# Patient Record
Sex: MALE | Race: BLACK OR AFRICAN AMERICAN
[De-identification: names, ages, dates, MRNs, and addresses within clinical notes are randomized per-mention and may not be internally consistent; named-entity substitution may affect disease eponyms.]

---

## 2021-02-19 ENCOUNTER — HOSPITAL ENCOUNTER (INPATIENT)
Dept: HOSPITAL 53 - M ED | Age: 51
LOS: 5 days | Discharge: HOME | DRG: 885 | End: 2021-02-24
Attending: PSYCHIATRY & NEUROLOGY
Payer: MEDICARE

## 2021-02-19 VITALS — HEIGHT: 72 IN | WEIGHT: 230.49 LBS | BODY MASS INDEX: 31.22 KG/M2

## 2021-02-19 DIAGNOSIS — Z88.8: ICD-10-CM

## 2021-02-19 DIAGNOSIS — R45.851: ICD-10-CM

## 2021-02-19 DIAGNOSIS — F31.9: Primary | ICD-10-CM

## 2021-02-19 DIAGNOSIS — F60.2: ICD-10-CM

## 2021-02-19 DIAGNOSIS — Z79.82: ICD-10-CM

## 2021-02-19 DIAGNOSIS — E78.5: ICD-10-CM

## 2021-02-19 DIAGNOSIS — F41.9: ICD-10-CM

## 2021-02-19 DIAGNOSIS — J44.9: ICD-10-CM

## 2021-02-19 DIAGNOSIS — E11.9: ICD-10-CM

## 2021-02-19 DIAGNOSIS — Z79.899: ICD-10-CM

## 2021-02-19 DIAGNOSIS — I10: ICD-10-CM

## 2021-02-19 LAB
ALBUMIN SERPL BCG-MCNC: 3.7 GM/DL (ref 3.2–5.2)
ALT SERPL W P-5'-P-CCNC: 27 U/L (ref 12–78)
AMPHETAMINES UR QL SCN: POSITIVE
APAP SERPL-MCNC: < 2 UG/ML (ref 10–30)
BARBITURATES UR QL SCN: NEGATIVE
BASOPHILS # BLD AUTO: 0.1 10^3/UL (ref 0–0.2)
BASOPHILS NFR BLD AUTO: 0.7 % (ref 0–1)
BENZODIAZ UR QL SCN: NEGATIVE
BILIRUB CONJ SERPL-MCNC: 0.2 MG/DL (ref 0–0.2)
BILIRUB SERPL-MCNC: 0.8 MG/DL (ref 0.2–1)
BUN SERPL-MCNC: 17 MG/DL (ref 7–18)
BZE UR QL SCN: POSITIVE
CALCIUM SERPL-MCNC: 9 MG/DL (ref 8.5–10.1)
CANNABINOIDS UR QL SCN: NEGATIVE
CHLORIDE SERPL-SCNC: 105 MEQ/L (ref 98–107)
CK SERPL-CCNC: 639 U/L (ref 39–308)
CO2 SERPL-SCNC: 24 MEQ/L (ref 21–32)
CREAT SERPL-MCNC: 1.58 MG/DL (ref 0.7–1.3)
EOSINOPHIL # BLD AUTO: 0.1 10^3/UL (ref 0–0.5)
EOSINOPHIL NFR BLD AUTO: 1.5 % (ref 0–3)
ETHANOL SERPL-MCNC: 0.01 % (ref 0–0.01)
GFR SERPL CREATININE-BSD FRML MDRD: 49.7 ML/MIN/{1.73_M2} (ref 56–?)
GLUCOSE SERPL-MCNC: 115 MG/DL (ref 70–100)
HCT VFR BLD AUTO: 44.5 % (ref 42–52)
HGB BLD-MCNC: 14.8 G/DL (ref 13.5–17.5)
LYMPHOCYTES # BLD AUTO: 4.9 10^3/UL (ref 1.5–5)
LYMPHOCYTES NFR BLD AUTO: 55.8 % (ref 24–44)
MCH RBC QN AUTO: 28.2 PG (ref 27–33)
MCHC RBC AUTO-ENTMCNC: 33.3 G/DL (ref 32–36.5)
MCV RBC AUTO: 84.9 FL (ref 80–96)
METHADONE UR QL SCN: NEGATIVE
MONOCYTES # BLD AUTO: 0.6 10^3/UL (ref 0–0.8)
MONOCYTES NFR BLD AUTO: 7 % (ref 2–8)
NEUTROPHILS # BLD AUTO: 3.1 10^3/UL (ref 1.5–8.5)
NEUTROPHILS NFR BLD AUTO: 34.8 % (ref 36–66)
OPIATES UR QL SCN: NEGATIVE
PCP UR QL SCN: NEGATIVE
PLATELET # BLD AUTO: 261 10^3/UL (ref 150–450)
POTASSIUM SERPL-SCNC: 4.1 MEQ/L (ref 3.5–5.1)
PROT SERPL-MCNC: 7.1 GM/DL (ref 6.4–8.2)
RBC # BLD AUTO: 5.24 10^6/UL (ref 4.3–6.1)
SALICYLATES SERPL-MCNC: 2.7 MG/DL (ref 5–30)
SODIUM SERPL-SCNC: 138 MEQ/L (ref 136–145)
TROPONIN I SERPL-MCNC: 0.09 NG/ML (ref ?–0.1)
TSH SERPL DL<=0.005 MIU/L-ACNC: 0.34 UIU/ML (ref 0.36–3.74)
WBC # BLD AUTO: 8.8 10^3/UL (ref 4–10)

## 2021-02-19 NOTE — CCD
Summarization Of Episode

                             Created on: 2021



STEPHENIE KAISER

External Reference #: [id number]

: 1970

Sex: Male



Demographics





                          Address                   [address]  [address]

 

                          Home Phone                [phone]

 

                          Preferred Language        English

 

                          Marital Status            

 

                          Congregation Affiliation     NONE

 

                          Race                      Black or 

 

                          Ethnic Group              Not  or 





Author





                          Author                    [facility]              [facility]

 

                          Address                   Unknown

 

                          Phone                     Unavailable







Support





                Name            Relationship    Address         Phone

 

                UE              Next Of Kin     Unknown         Unavailable



                                  



Re-disclosure Warning

          The records that you are about to access may contain information from 
federally-assisted alcohol or drug abuse programs. If such information is 
present, then the following federally mandated warning applies: This information
has been disclosed to you from records protected by federal confidentiality 
rules (42 CFR part 2). The federal rules prohibit you from making any further 
disclosure of this information unless further disclosure is expressly permitted 
by the written consent of the person to whom it pertains or as otherwise 
permitted by 42 CFR part 2. A general authorization for the release of medical 
or other information is NOT sufficient for this purpose. The Federal rules 
restrict any use of the information to criminally investigate or prosecute any 
alcohol or drug abuse patient.The records that you are about to access may 
contain highly sensitive health information, the redisclosure of which is 
protected by Article 27-F of the [facility] Public Health law. If you 
continue you may have access to information: Regarding HIV / AIDS; Provided by 
facilities licensed or operated by the [facility] Office of Mental Health; 
or Provided by the [facility] Office for People With Developmental 
Disabilities. If such information is present, then the following [place] 
mandated warning applies: This information has been disclosed to you from 
confidential records which are protected by state law. State law prohibits you 
from making any further disclosure of this information without the specific 
written consent of the person to whom it pertains, or as otherwise permitted by 
law. Any unauthorized further disclosure in violation of state law may result in
a fine or senior care sentence or both. A general authorization for the release of 
medical or other information is NOT sufficient authorization for further disc
losure.                                                          



Insurance Providers

          



             Payer name   Policy type / Coverage type Policy ID    Covered party

 ID Covered 

party's relationship to lock Policy Lock             Plan Information

 

          [facility]           [id number]                             12

[id number]

## 2021-02-20 VITALS — DIASTOLIC BLOOD PRESSURE: 88 MMHG | SYSTOLIC BLOOD PRESSURE: 140 MMHG

## 2021-02-20 VITALS — DIASTOLIC BLOOD PRESSURE: 84 MMHG | SYSTOLIC BLOOD PRESSURE: 186 MMHG

## 2021-02-20 RX ADMIN — ACETAMINOPHEN PRN MG: 325 TABLET ORAL at 01:57

## 2021-02-20 RX ADMIN — INSULIN LISPRO SCH UNITS: 100 INJECTION, SOLUTION INTRAVENOUS; SUBCUTANEOUS at 21:00

## 2021-02-20 RX ADMIN — Medication SCH UNITS: at 14:48

## 2021-02-20 RX ADMIN — BUDESONIDE AND FORMOTEROL FUMARATE DIHYDRATE SCH PUFF: 160; 4.5 AEROSOL RESPIRATORY (INHALATION) at 21:00

## 2021-02-20 RX ADMIN — ACETAMINOPHEN PRN MG: 325 TABLET ORAL at 21:05

## 2021-02-20 RX ADMIN — ASPIRIN 81 MG CHEWABLE TABLET SCH MG: 81 TABLET CHEWABLE at 14:49

## 2021-02-20 RX ADMIN — INSULIN DETEMIR SCH UNITS: 100 INJECTION, SOLUTION SUBCUTANEOUS at 21:01

## 2021-02-20 RX ADMIN — INSULIN LISPRO SCH UNITS: 100 INJECTION, SOLUTION INTRAVENOUS; SUBCUTANEOUS at 17:08

## 2021-02-20 RX ADMIN — ATORVASTATIN CALCIUM SCH MG: 20 TABLET, FILM COATED ORAL at 14:48

## 2021-02-20 RX ADMIN — LOSARTAN POTASSIUM SCH MG: 25 TABLET, FILM COATED ORAL at 15:58

## 2021-02-20 NOTE — MHHPEPDOC
General


Date Of Admission:  Feb 20, 2021


Legal Status:  9.39


Chief Complaint


"Hearing voices, suicidal thoughts, alcohol and drugs "





History of Present Illness


HISTORY OF THE PRESENT ILLNESS: Patient is a 50 -year-old , 

male, who states he is hearing voices, having suicidal thoughts using alcohol 

and drugs. He states he has been having these symptoms for 4-5 days. He rents a 

room, which is part of an apartment and has been in [place] for approximately 

3 weeks. He has no psychiatric history here because he has never been in 

[place] and as mentioned, he has been in [place], 2-3 weeks prior to that he

was in [place] and states she had no psychiatric care there. He does recall that

he has had psychiatric care at some point, but he has no memory of his last 

visit. He has had several hospitalizations. He does not recall when the last one

was. He was born in [place], and states unfortunately, "unfortunately he has 

family there." He has a grandmother and cousins in [place]. 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 He states pr

esently "there is no person in the world requiring my presence and no place for 

me to be." He has been  and  twice and he has 2 children from his

first wife, but he doesn't know where they are. He has no present legal 

difficulties but has previously been arrested for disorderly conduct. His 

alcohol use has been in and he states that some first time he's been drinking in

30 years, but he has also used methamphetamines and crack. He states this is his

first time using methamphetamine, but he is been using crack off and on since 

1996. He states he is hearing voices modeled though they are, and numbering 3. 

He states they are talking and berating him. He states he has been depressed 

since Monday of this week. He states he is made over 15 suicide attempts using 

different methods of overdose and insulin. He is self educated but also has an 

associates degree. He states he has been treated for depression using Zoloft and

Depakote did not like the Depakote because he stopped the elevated moods. He 

states he has never had manic episodes or over elevated moods. He states he is 

not presently paranoid, but he is having auditory hallucinations and suicidal 

ideas. He is fully oriented. He has had difficulty sleeping. His appetite is 

good.





Psychiatric Review of Systems


Depression (2 or more weeks):  depressed mood, insomnia/hypersomnia, feelings of

worthlesness, suicidal thoughts


Tisha (4 or more days of):  denies


Psychosis:  auditory hallucination


PTSD:  denies


Anxiety:  denies





Past Psychiatric History


Previous Psychiatric Diagnosis: He does not have a documented diagnosis and does

not recall treatment except for use of Zoloft and Depakote.


Previous Psychiatric Admissions:. He has had psychiatric admissions but does not

recall the last one.


Suicide Attempts:. He states he is made over 15 suicide attempts.


Psychiatric Follow-up:. He does not recall or discuss any psychiatric follow-up.


Psychiatric medications:. In the past. He recalls Zoloft and Depakote.





Past Medical History


Medical Problems


He describes no medical problems


Head Injury:  No


Seizures:  No


Hospitalizations:  Yes


Surgeries:  No





Family Medical/Psychiatric HX


Psychiatric Disorders:  Yes


Addiction:  Yes


Suicide Attemps/Completions:  Yes





Addiction History


alcohol, cocaine, methamphetamines





Social History


Childhood: 


Abuse/Trauma:.


Current Living Situation: Presently living in an apartment in [place].


Education: Associates degree.


Employment:. Numerous businesses.


Social Support: None noted.


Legal:. History of disorderly conduct.


Marital:. 2 marriages with 2 children from first marriage.





Mental Status Examination


General Appearance:  well groomed


Build:  average


Demeanor:  average


Eye Contact:  avoidant


Activity:  average


Behavior:  cooperative


Speech:  clear


Mood:  euthymic


Mood


Sad


Affect:  flat


Thought Process:  logical/linear


Thought Content (Delusions):  none reported


Thought Content (Aggressive):  none reported


Perception (Hallucinations):  auditory


Perception (Other):  none reported


Cognition (Impairment of):  none reported


Cognition(Intelligence Est.):  above average


Oriented:  Oriented times three


Insight:  good


Judgment:  Poor


Psychosis:  Denies





Diagnoses


Major depressive illness, chronic substance abuse





A-FIB/CHADSVASC


A-FIB History


Current/History of A-Fib/PAF?:  No


Current PO Anticoag Therapy:  No





Age/Risk Factor Scoring


CHADSVASC:  








CHADSVASC Response (Comments) Value


 


Age Risk Factor Age < 65 years old 0


 


Total  0











Treatment


Treatment ordered:  NONE





Initial Treatment Plan


1. Patient was admitted on a [9.39] status.


2. Complete history was obtained.


3. With patients permission, family will be contacted and database will be 

expanded. 


4. Patients medication regimen will be reviewed and changed accordingly. 


5. Patient will be provided with protected environment. 


6. Patient will be treated with individual, group, and milieu therapies. 


7. Patient will receive supportive psych-education.


8. Discharge planning will commence immediately.


9. Outpatient follow-up treatment will be strongly recommended.


10. The initial treatment plan will focus initially on:


* Depression.


* Risk for suicide.





ESTIMATED LENGTH OF STAY: - DAYS.





TIME SPENT COUNSELING AND COORDINATING INITIAL CARE:  minutes.





Vital Signs





Vital Signs








  Date Time  Temp Pulse Resp B/P (MAP) Pulse Ox O2 Delivery O2 Flow Rate FiO2


 


2/20/21 06:56 97.2 88 18 186/84 (118) 96 Room Air  











Laboratory Data


24H Labs


Laboratory Tests 2


2/19/21 16:47: 


Immature Granulocyte % (Auto) 0.2, Neutrophils (%) (Auto) 34.8L, Lymphocytes (%)

 (Auto) 55.8H, Monocytes (%) (Auto) 7.0, Eosinophils (%) (Auto) 1.5, Basophils 

(%) (Auto) 0.7, Neutrophils # (Auto) 3.1, Lymphocytes # (Auto) 4.9, Monocytes # 

(Auto) 0.6, Eosinophils # (Auto) 0.1, Basophils # (Auto) 0.1, Nucleated Red 

Blood Cells % (auto) 0.0, Anion Gap 9, Glomerular Filtration Rate 49.7L, Calcium

 Level 9.0, Total Bilirubin 0.8, Direct Bilirubin 0.2, Aspartate Amino Transf 

(AST/SGOT) 20, Alanine Aminotransferase (ALT/SGPT) 27, Alkaline Phosphatase 80, 

Total Creatine Kinase 639H, Troponin I 0.09, Total Protein 7.1, Albumin 3.7, 

Albumin/Globulin Ratio 1.1, Thyroid Stimulating Hormone (TSH) 0.341L, Sali

cylates Level 2.7L, Urine Opiates Screen NEGATIVE, Urine Methadone Screen 

NEGATIVE, Acetaminophen Level < 2.0L, Urine Barbiturates Screen NEGATIVE, Urine 

Phencyclidine Screen NEGATIVE, Urine Amphetamines Screen POSITIVEH, Urine 

Benzodiazepines Screen NEGATIVE, Urine Cocaine Metabolite Screen POSITIVEH, 

Urine Cannabinoids Screen NEGATIVE, Ethyl Alcohol Level 0.007


CBC/BMP


Laboratory Tests


2/19/21 16:47











Medications


Scheduled


Amlodipine Besylate (Amlodipine Besylate) 10 Mg Tablet, 10 MG PO DAILY, 

(Reported)


Aspirin (Aspirin) 81 Mg Tab.chew, 81 MG PO DAILY for pain, (Reported)


Atorvastatin Calcium (Atorvastatin Calcium) 80 Mg Tablet, 80 MG PO DAILY, 

(Reported)


Insulin Glargine,Hum.rec.anlog (Lantus Solostar) 100 Unit/1 Ml Insuln.pen, 10 

UNIT SC QPM, (Reported)


Ipratropium/Albuterol Sulfate (Combivent Respimat  Mcg) 4 Gm Mist.inhal, 2

 PUFF INH TID, (Reported)





Allergies


Coded Allergies:  


     lisinopril (Verified  Allergy, Unknown, Tongue swelling, 2/19/21)


     prochlorperazine (Verified  Allergy, Unknown, tongue swelling, 2/19/21)











ANGELINE GONZALEZ MD            Feb 20, 2021 09:58

## 2021-02-20 NOTE — HPEPDOC
General


Date of Admission


2021 at 19:34


Date of Service:  2021


Chief Complaint


The patient is a 50-year-old male admitted with a reason for visit of 

Schizophrenia.


Source:  Patient


Exam Limitations:  No limitations





History of Present Illness


Pt is 51 yo M with PMH of type 2  diabetes, Anxiety, Depression, chronic kidney 

diseases, hypertension, hyperlipidemia presented to the hospital with hearing 

voices, having suicidal thoughts using alcohol and drugs. He states he has been 

having these symptoms for 4-5 days. Patient denies fever, chills, nausea, chest 

pain, shortness of breath, diarrhea or dysuria





Home Medications


Scheduled


Amlodipine Besylate (Amlodipine Besylate) 10 Mg Tablet, 5 MG PO DAILY for ., 

(Reported)


Aspirin (Aspirin) 81 Mg Tab.chew, 81 MG PO DAILY for ., (Reported)


Atorvastatin Calcium (Atorvastatin Calcium) 80 Mg Tablet, 80 MG PO DAILY for ., 

(Reported)


Budesonide/Formoterol (Symbicort 160-4.5 Mcg Inhaler) 6 Gm Hfa.aer.ad, 2 PUFF 

INH BID for ., (Reported)


Cholecalciferol (Vitamin D3) (Vitamin D3) 1,000 Unit Tablet, 1,000 UNITS PO 

DAILY for ., (Reported)


Insulin Glargine,Hum.rec.anlog (Lantus Solostar) 100 Unit/1 Ml Insuln.pen, 20 

UNIT SC QPM for ., (Reported)


Omega-3/Dha/Epa/Fish Oil (Omega-3 Fish Oil 1,000 mg Sfgl) 1,000 Mg Capsule, 

2,000 MG PO BID for ., (Reported)





Scheduled PRN


Ipratropium/Albuterol Sulfate (Combivent Respimat  Mcg) 4 Gm Mist.inhal, 1

PUFF INH QID PRN for SHORTNESS OF BREATH, (Reported)





Miscellaneous Medications


[Comments]  , for ., (Reported)


   MED LIST PROVIDED BY [facility] 





Allergies


Coded Allergies:  


     lisinopril (Verified  Allergy, Unknown, Tongue swelling, 21)


     prochlorperazine (Verified  Allergy, Unknown, tongue swelling, 21)





Past Medical History


Medical History


type 2  diabetes, Anxiety, Depression, chronic kidney diseases, hypertension, 

hyperlipidemia, COPD, Nephrotic syndrome





Family History


Mother  from colon cancer and uterine cancer





Social History


* Smoker:  current smoker


Alcohol:  heavy


Drugs:  IV drug use





A-FIB/CHADSVASC


A-FIB History


Current/History of A-Fib/PAF?:  No


Current PO Anticoag Therapy:  No





Review of Systems


Constitutional:  Denies: Chills


Eyes:  Denies: Pain


ENT:  Denies: Head Aches


Skin:  Denies: Rash


Pulmonary:  Denies: Dyspnea


Cardiovascular:  Denies: Chest Pain, Palpitations


Gastrointestinal:  Denies: Nausea


Genitourinary:  Denies: Dysuria


Hematologic:  Denies: Bruising


Endocrine:  Denies: Polydipsia, Polyphagia


Musculoskeletal:  Denies: Neck Pain


Neurological:  Denies: Weakness


Psych:  Reports: Anxiety, Depression





Physical Examination


General Exam:  Positive: Alert, Cooperative


Eye Exam:  Positive: PERRLA


ENT Exam:  Positive: Atraumatic


Neck Exam:  Positive: Supple; 


   Negative: JVD


Chest Exam:  Positive: Clear to auscultation


Heart Exam:  Positive: Rate Normal


Telemetry:  Positive: No significant arrhythmia


Abdomen Exam:  Positive: Normal bowel sounds


Extremity Exam:  Negative: Clubbing, Cyanosis


Skin Exam:  Positive: Nl turgor and temperature


Neuro Exam:  Positive: Strength at 5/5 X4 ext


Psych Exam:  Positive: Anxiety





Vital Signs





Vital Signs








  Date Time  Temp Pulse Resp B/P (MAP) Pulse Ox O2 Delivery O2 Flow Rate FiO2


 


21 06:56 97.2 88 18 186/84 (118) 96 Room Air  











Laboratory Data


Labs 24H


Laboratory Tests 2


21 16:47: 


Immature Granulocyte % (Auto) 0.2, Neutrophils (%) (Auto) 34.8L, Lymphocytes (%)

 (Auto) 55.8H, Monocytes (%) (Auto) 7.0, Eosinophils (%) (Auto) 1.5, Basophils 

(%) (Auto) 0.7, Neutrophils # (Auto) 3.1, Lymphocytes # (Auto) 4.9, Monocytes # 

(Auto) 0.6, Eosinophils # (Auto) 0.1, Basophils # (Auto) 0.1, Nucleated Red 

Blood Cells % (auto) 0.0, Anion Gap 9, Glomerular Filtration Rate 49.7L, Calcium

 Level 9.0, Total Bilirubin 0.8, Direct Bilirubin 0.2, Aspartate Amino Transf 

(AST/SGOT) 20, Alanine Aminotransferase (ALT/SGPT) 27, Alkaline Phosphatase 80, 

Total Creatine Kinase 639H, Troponin I 0.09, Total Protein 7.1, Albumin 3.7, 

Albumin/Globulin Ratio 1.1, Thyroid Stimulating Hormone (TSH) 0.341L, Salicylate

s Level 2.7L, Urine Opiates Screen NEGATIVE, Urine Methadone Screen NEGATIVE, 

Acetaminophen Level < 2.0L, Urine Barbiturates Screen NEGATIVE, Urine 

Phencyclidine Screen NEGATIVE, Urine Amphetamines Screen POSITIVEH, Urine 

Benzodiazepines Screen NEGATIVE, Urine Cocaine Metabolite Screen POSITIVEH, Ur

ine Cannabinoids Screen NEGATIVE, Ethyl Alcohol Level 0.007


CBC/BMP


Laboratory Tests


21 16:47








Microbiology





Microbiology


21 Respiratory Virus Panel (PCR) (RICH) - Final, Complete





 Assessment/Plan


Pt is 51 yo M with PMH of type 2  diabetes, Anxiety, Depression, chronic kidney 

diseases, hypertension, hyperlipidemia presented to the hospital with hearing 

voices, having suicidal thoughts using alcohol and drugs. He states he has been 

having these symptoms for 4-5 days. Patient denies fever, chills, nausea, chest 

pain, shortness of breath, diarrhea or dysuria





Problems





(1) Schizophrenia


Status:  Acute


Problem Text:  defer treatment to psych team





(2) Diabetes mellitus


Status:  Chronic


Problem Text:  Insulin sliding scale


Detemir twice a day


Diabetes diet





(3) Hyperlipidemia


Status:  Chronic


Problem Text:  Continue statin





(4) Hypertension


Status:  Chronic


Problem Text:  I added lisinopril to his regimen


Patient has diabetes and he will benefit from ARB inhibitors





(5) COPD (chronic obstructive pulmonary disease)


Status:  Chronic


Problem Text:  Not in acute exacerbation


Continue inhalers








Plan / VTE


VTE Prophylaxis Ordered?:  No


VTE Exclusion Mechanical Proph:  Low Risk for VTE











MAJOR CHENG DO            2021 14:17

## 2021-02-20 NOTE — ECGEPIP
[facility] - ED

                                       

                                       Test Date:    2021

Pat Name:     STEPHENIE KAISER            Department:   

Patient ID:   [id number]                 Room:         [place] [address]

Gender:       Male                     Technician:   shamar

:          1970               Requested By: Aleyda Hernandez 

Order Number: [id number]     Reading MD:   Aleyda Hernandez

                                 Measurements

Intervals                              Axis          

Rate:         94                       P:            

MA:           176                      QRS:          -7

QRSD:         126                      T:            -25

QT:           390                                    

QTc:          487                                    

                           Interpretive Statements

Normal sinus rhythm

Left ventricular hypertrophy with QRS widening ( Sokolow-Avendaño , [place]ron

product )

Cannot rule out Inferior infarct , age undetermined

cannot rule out, aterior septal infarct, age indeterminate

no prior

Electronically Signed on 2021 20:46:25 EST by Aleyda Hernandez

## 2021-02-21 VITALS — SYSTOLIC BLOOD PRESSURE: 121 MMHG | DIASTOLIC BLOOD PRESSURE: 65 MMHG

## 2021-02-21 VITALS — SYSTOLIC BLOOD PRESSURE: 148 MMHG | DIASTOLIC BLOOD PRESSURE: 98 MMHG

## 2021-02-21 RX ADMIN — INSULIN LISPRO SCH UNITS: 100 INJECTION, SOLUTION INTRAVENOUS; SUBCUTANEOUS at 11:52

## 2021-02-21 RX ADMIN — ATORVASTATIN CALCIUM SCH MG: 20 TABLET, FILM COATED ORAL at 09:28

## 2021-02-21 RX ADMIN — INSULIN LISPRO SCH UNITS: 100 INJECTION, SOLUTION INTRAVENOUS; SUBCUTANEOUS at 07:03

## 2021-02-21 RX ADMIN — INSULIN DETEMIR SCH UNITS: 100 INJECTION, SOLUTION SUBCUTANEOUS at 09:27

## 2021-02-21 RX ADMIN — BUDESONIDE AND FORMOTEROL FUMARATE DIHYDRATE SCH PUFF: 160; 4.5 AEROSOL RESPIRATORY (INHALATION) at 09:26

## 2021-02-21 RX ADMIN — Medication SCH UNITS: at 09:28

## 2021-02-21 RX ADMIN — LOSARTAN POTASSIUM SCH MG: 25 TABLET, FILM COATED ORAL at 09:28

## 2021-02-21 RX ADMIN — INSULIN DETEMIR SCH UNITS: 100 INJECTION, SOLUTION SUBCUTANEOUS at 20:50

## 2021-02-21 RX ADMIN — ASPIRIN 81 MG CHEWABLE TABLET SCH MG: 81 TABLET CHEWABLE at 09:24

## 2021-02-21 RX ADMIN — INSULIN LISPRO SCH UNITS: 100 INJECTION, SOLUTION INTRAVENOUS; SUBCUTANEOUS at 20:58

## 2021-02-21 RX ADMIN — INSULIN LISPRO SCH UNITS: 100 INJECTION, SOLUTION INTRAVENOUS; SUBCUTANEOUS at 16:50

## 2021-02-21 RX ADMIN — BUDESONIDE AND FORMOTEROL FUMARATE DIHYDRATE SCH PUFF: 160; 4.5 AEROSOL RESPIRATORY (INHALATION) at 20:50

## 2021-02-21 NOTE — MHIPNPDOC
[facility] Progress Note


Progress Note


DATE OF SERVICE: 2/21/21





HISTORY: 50-year-old male initially did not want to be seen today, but now 

complains of hearing voices and requests medication.





VITAL SIGNS: See below.





NEW TEST RESULTS: None.





CURRENT MEDICATIONS: See below.





MENTAL STATUS EXAMINATION:


Patient is a 50-year old male, who is, complaining of auditory hallucinations.


Speech: Is, normal.


Language skills are. Normal.


Thought processes including: Persecutory with auditory hallucinations.


Thought content: As above. Abstract reasoning, and computation: Able to 

abstract. Description of associations:. No looseness associations.


Description of abnormal or psychotic thoughts:, Hearing voices and persecutory 

thoughts.


Judgment: Fair.


Insight: Fair.


Orientation: 3.


Recent and remote memory: Intact.


Attention span and concentration: Intact.


Language: Normal.


Fund of knowledge: Full.


Mood: Anxious. Affect:, Congruent.





DIAGNOSES:


1. 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


2. None.


3.. None.


 


ASSESSMENT: As above





MANAGEMENT PLAN:, We'll begin on low dose Zyprexa.





TIME SPENT: 30 minutes.





Vital Signs





Vital Signs








  Date Time  Temp Pulse Resp B/P (MAP) Pulse Ox O2 Delivery O2 Flow Rate FiO2


 


2/21/21 09:29  87      


 


2/21/21 09:28    142/96    


 


2/21/21 06:28 97.5  18  98 Room Air  











Laboratory Data


24H Labs


Laboratory Tests 2


2/20/21 17:06: Bedside Glucose (Misc Panel) 131H


2/20/21 21:00: Bedside Glucose (Misc Panel) 155H


2/21/21 06:27: Bedside Glucose (Misc Panel) 149H


2/21/21 11:51: Bedside Glucose (Misc Panel) 199H





Current Medications





Current Medications








 Medications


  (Trade)  Dose


 Ordered  Sig/Helen


 Route


 PRN Reason  Start Time


 Stop Time Status Last Admin


Dose Admin


 


 Acetaminophen


  (Tylenol Tab)  650 mg  Q6HP  PRN


 PO


 HEADACHE or DISCOMFORT  2/19/21 19:45


    2/20/21 21:05





 


 Al Hydrox/Mg


 Hydrox/Simethicone


  (Mylanta)  30 ml  Q4HP  PRN


 PO


 HEARTBURN/INDIGESTION  2/19/21 19:45


     





 


 Albuterol/


 Ipratropium


  (Combivent


 Respimat


 100-20mcg)  1 puff  QID  PRN


 INH


 SHORTNESS OF BREATH  2/20/21 14:15


    2/21/21 01:23





 


 Amlodipine


 Besylate


  (Norvasc)  5 mg  DAILY


 PO


   2/20/21 09:00


    2/21/21 09:29





 


 Aspirin


  (Aspirin


 Chewable)  81 mg  DAILY


 PO


   2/20/21 09:00


    2/21/21 09:24





 


 Atorvastatin


 Calcium


  (Lipitor)  80 mg  DAILY


 PO


   2/20/21 09:00


    2/21/21 09:28





 


 Budesonide/


 Formoterol


 Fumarate


  (Symbicort 160/


 4.5mcg)  2 puff  BID


 INH


   2/20/21 21:00


    2/21/21 09:26





 


 Dextrose


  (Dextrose 50%)  25 ml  ASDIRECTED  PRN


 IV


 SEE LABEL COMMENTS  2/20/21 14:15


     





 


 Glucagon


  (Glucagon)  1 mg  ASDIRECTED  PRN


 SC


 SEE LABEL COMMENTS  2/20/21 14:15


     





 


 Glucose


  (Glucose)  16 GM  ASDIRECTED  PRN


 PO


 SEE LABEL COMMENTS  2/20/21 14:15


     





 


 Home Med


  (Med Rec


 Complete!)    ASDIRECTED


 XX


   2/20/21 10:00


 2/20/21 10:12 DC  





 


 Hydroxyzine HCl


  (Atarax)  50 mg  TID


 PO


   2/20/21 16:00


    2/21/21 09:28





 


 Insulin Detemir


  (Levemir Insulin)  10 units  BID


 SC


   2/20/21 21:00


    2/21/21 09:27





 


 Insulin Human


 Lispro


  (HumaLOG INSULIN)  SEE


 PROTOCOL


 TABLE  AC


 SC


   2/20/21 17:30


    2/21/21 07:03





 


 Insulin Human


 Lispro


  (HumaLOG INSULIN)  SEE


 PROTOCOL


 TABLE  QHS


 SC


   2/20/21 21:00


     





 


 Lisinopril


  (Prinivil)  20 mg  DAILY


 PO


   2/21/21 09:00


 2/20/21 14:25 DC  





 


 Losartan Potassium


  (Cozaar)  25 mg  DAILY


 PO


   2/20/21 09:00


    2/21/21 09:28





 


 Magnesium


 Hydroxide


  (Milk Of


 Magnesia)  30 ml  DAILYPRN  PRN


 PO


 CONSTIPATION  2/19/21 19:45


     





 


 Trazodone HCl


  (Desyrel)  50 mg  QHSP  PRN


 PO


 INSOMNIA  2/19/21 19:45


    2/20/21 02:00





 


 Vitamin D


  (Vitamin D)  1,000 units  DAILY


 PO


   2/20/21 09:00


    2/21/21 09:28














Allergies


Coded Allergies:  


     lisinopril (Verified  Allergy, Unknown, Tongue swelling, 2/19/21)


     prochlorperazine (Verified  Allergy, Unknown, tongue swelling, 2/19/21)











ANGELINE GONZALEZ MD            Feb 21, 2021 15:18

## 2021-02-22 VITALS — SYSTOLIC BLOOD PRESSURE: 127 MMHG | DIASTOLIC BLOOD PRESSURE: 77 MMHG

## 2021-02-22 VITALS — DIASTOLIC BLOOD PRESSURE: 86 MMHG | SYSTOLIC BLOOD PRESSURE: 148 MMHG

## 2021-02-22 VITALS — DIASTOLIC BLOOD PRESSURE: 77 MMHG | SYSTOLIC BLOOD PRESSURE: 127 MMHG

## 2021-02-22 RX ADMIN — INSULIN LISPRO SCH UNITS: 100 INJECTION, SOLUTION INTRAVENOUS; SUBCUTANEOUS at 12:00

## 2021-02-22 RX ADMIN — ASPIRIN 81 MG CHEWABLE TABLET SCH MG: 81 TABLET CHEWABLE at 09:58

## 2021-02-22 RX ADMIN — INSULIN LISPRO SCH UNITS: 100 INJECTION, SOLUTION INTRAVENOUS; SUBCUTANEOUS at 20:42

## 2021-02-22 RX ADMIN — LOSARTAN POTASSIUM SCH MG: 25 TABLET, FILM COATED ORAL at 09:57

## 2021-02-22 RX ADMIN — INSULIN DETEMIR SCH UNITS: 100 INJECTION, SOLUTION SUBCUTANEOUS at 21:06

## 2021-02-22 RX ADMIN — BUDESONIDE AND FORMOTEROL FUMARATE DIHYDRATE SCH PUFF: 160; 4.5 AEROSOL RESPIRATORY (INHALATION) at 19:54

## 2021-02-22 RX ADMIN — BUDESONIDE AND FORMOTEROL FUMARATE DIHYDRATE SCH PUFF: 160; 4.5 AEROSOL RESPIRATORY (INHALATION) at 09:56

## 2021-02-22 RX ADMIN — INSULIN LISPRO SCH UNITS: 100 INJECTION, SOLUTION INTRAVENOUS; SUBCUTANEOUS at 07:30

## 2021-02-22 RX ADMIN — Medication SCH UNITS: at 09:58

## 2021-02-22 RX ADMIN — INSULIN LISPRO SCH UNITS: 100 INJECTION, SOLUTION INTRAVENOUS; SUBCUTANEOUS at 17:26

## 2021-02-22 RX ADMIN — INSULIN DETEMIR SCH UNITS: 100 INJECTION, SOLUTION SUBCUTANEOUS at 10:01

## 2021-02-22 RX ADMIN — ATORVASTATIN CALCIUM SCH MG: 20 TABLET, FILM COATED ORAL at 09:57

## 2021-02-22 NOTE — MHIPNPDOC
[facility] Progress Note


Progress Note


DATE OF SERVICE: 2/22/21





HISTORY:  Patient is a 50 -year-old , male, who states he is 

hearing voices, having suicidal thoughts using alcohol and drugs. He states he 

has been having these symptoms for 4-5 days. He rents a room, which is part of 

an apartment and has been in [place] for approximately 3 weeks. He has no 

psychiatric history here because he has never been in [place] and as 

mentioned, he has been in [place], 2-3 weeks prior to that he was in [place] 

and states she had no psychiatric care there. He does recall that he has had 

psychiatric care at some point, but he has no memory of his last visit. He has 

had several hospitalizations. He does not recall when the last one was. He was 

born in [place], and states unfortunately, "unfortunately he has family there." 

He has a grandmother and cousins in [place]. 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 

including delivery and digital marketing services. He states presently "there is

no person in the world requiring my presence and no place for me to be." He has 

been  and  twice and he has 2 children from his first wife, but 

he doesn't know where they are. He has no present legal difficulties but has 

previously been arrested for disorderly conduct. His alcohol use has been in and

he states that some first time he's been drinking in 30 years, but he has also 

used methamphetamines and crack. He states this is his first time using 

methamphetamine, but he is been using crack off and on since 1996. He states he 

is hearing voices modeled though they are, and numbering 3. He states they are 

talking and berating him. He states he has been depressed since Monday of this 

week. He states he is made over 15 suicide attempts using different methods of 

overdose and insulin. He is self educated but also has an associates degree. 





VITAL SIGNS: See below.








CURRENT MEDICATIONS: See below.





MENTAL STATUS EXAMINATION:Patient is a 50 -year-old , male, who 

states he is hearing voices, having suicidal thoughts using alcohol and drugs. 

He states he has been having these symptoms for 4-5 days. He rents a room, which

is part of an apartment and has been in [place] for approximately 3 weeks. He 

has no psychiatric history here because he has never been in [place] and as 

mentioned, he has been in [place].


Speech: Is  normal rate, tone and volume


Language skills are intact


Thought processes including: linear and goal oriented


Thought content: reports depression and suicidal, has some vague planning. 

Abstract reasoning, and computation: fair  


Description of associations: reports auditory hallucinations


Description of abnormal or psychotic thoughts: appears paranoid, refusing to 

answer question about how he came to [place], will not clarify 


Judgment: impaired


Insight: Impaired


Orientation: alert and oriented to person, place, time and situation


Recent and remote memory: intact


Attention span and concentration: fair


Language: expansive


Fund of knowledge: average


Mood: Depressed and irritable mood Affect: Flat, strong agitation





DIAGNOSES:


Major Depressive Disorder, Recurrent,  Recurrent, 


Polysubstance Use Disorder


Rule out Bipolar Disorder


Rule out Cluster B Personality Traits (Antisocial Personality Disorder)





ASSESSMENT: Patient was awaken for the interview.  He is alert and oriented, has

a depressed mood and affect.  He is moderately irritable and agitated with this 

writer when asked what brought him to [place].  He answered "its the luck of 

the draw."  When asked again if he had taken a bus or drove himself to a place 

where he has never lived he states "do you not understand what luck of the draw 

means?"  When asked to clarify that statement he states "are you trying to get 

me upset with this question?  I answered you.  I said "it's the luck of the 

draw."  This writer clarified whether he had been riding on a bus, decided to 

get off the bus and was not really wanting to be in [place] vs wanting a fresh

start in a new town.  Patient becomes even more agitated and postulates that 

this writer is trying to illicit more from the question to upset him.  


I offer to the patient that I believe that he is feeling unsafe but only want to

clarify his future outpatient needs if whether he is staying in [place] vs he 

is transient and not planning on staying. 


Patient is becoming very agitated, becomes aggressive in his posture and I 

quickly closed the session as he was not engaging and began to become more 

paranoid in the session.   


Patient is quite labile in mood during the interview.  He appears to have some 

Cluster B Personality Traits (antisocial traits).  He reported to the initial 

evaluator on call psychiatrist that had had 15+ suicide attempts and has been 

hospitalized but does not know when or where.  He reports no work history.  Was 

quite defensive when this writer attempts to piece more of his psychiatric 

history.  He appears to have no goals, no work history, no recollection of his 

past or avoids answering them.  He became very defensive when asked about the 

circumstances that brought him to a town in which he as never lived. He did 

report no current relationships.  Spoke with Primary RN, Frieda who gave 

additional information in that Patient had reported to her that he had been 

diagnosed with Malingering in the past. 


MANAGEMENT PLAN: Continue on medications, increase Zyprexa to 5 mg BID. Patient 

reported to RN that he feels racy on this medication. patient not stable for 

discharge





TIME SPENT: 25 minutes.





Vital Signs





Vital Signs








  Date Time  Temp Pulse Resp B/P (MAP) Pulse Ox O2 Delivery O2 Flow Rate FiO2


 


2/22/21 09:58  79  127/77    


 


2/22/21 07:14 97.6  14  96 Room Air  











Laboratory Data


24H Labs


Laboratory Tests 2


2/21/21 20:54: Bedside Glucose (Misc Panel) 175H


2/22/21 12:01: Bedside Glucose (Misc Panel) 142H





Current Medications





Current Medications








 Medications


  (Trade)  Dose


 Ordered  Sig/Helen


 Route


 PRN Reason  Start Time


 Stop Time Status Last Admin


Dose Admin


 


 Acetaminophen


  (Tylenol Tab)  650 mg  Q6HP  PRN


 PO


 HEADACHE or DISCOMFORT  2/19/21 19:45


    2/20/21 21:05





 


 Al Hydrox/Mg


 Hydrox/Simethicone


  (Mylanta)  30 ml  Q4HP  PRN


 PO


 HEARTBURN/INDIGESTION  2/19/21 19:45


     





 


 Albuterol/


 Ipratropium


  (Combivent


 Respimat


 100-20mcg)  1 puff  QID  PRN


 INH


 SHORTNESS OF BREATH  2/20/21 14:15


    2/21/21 01:23





 


 Amlodipine


 Besylate


  (Norvasc)  5 mg  DAILY


 PO


   2/20/21 09:00


    2/22/21 09:58





 


 Aspirin


  (Aspirin


 Chewable)  81 mg  DAILY


 PO


   2/20/21 09:00


    2/22/21 09:58





 


 Atorvastatin


 Calcium


  (Lipitor)  80 mg  DAILY


 PO


   2/20/21 09:00


    2/22/21 09:57





 


 Budesonide/


 Formoterol


 Fumarate


  (Symbicort 160/


 4.5mcg)  2 puff  BID


 INH


   2/20/21 21:00


    2/22/21 09:56





 


 Dextrose


  (Dextrose 50%)  25 ml  ASDIRECTED  PRN


 IV


 SEE LABEL COMMENTS  2/20/21 14:15


     





 


 Glucagon


  (Glucagon)  1 mg  ASDIRECTED  PRN


 SC


 SEE LABEL COMMENTS  2/20/21 14:15


     





 


 Glucose


  (Glucose)  16 GM  ASDIRECTED  PRN


 PO


 SEE LABEL COMMENTS  2/20/21 14:15


     





 


 Home Med


  (Med Rec


 Complete!)    ASDIRECTED


 XX


   2/20/21 10:00


 2/20/21 10:12 DC  





 


 Hydroxyzine HCl


  (Atarax)  50 mg  TID


 PO


   2/20/21 16:00


    2/22/21 09:58





 


 Insulin Detemir


  (Levemir Insulin)  10 units  BID


 SC


   2/20/21 21:00


    2/22/21 10:01





 


 Insulin Human


 Lispro


  (HumaLOG INSULIN)  SEE


 PROTOCOL


 TABLE  AC


 SC


   2/20/21 17:30


    2/21/21 07:03





 


 Insulin Human


 Lispro


  (HumaLOG INSULIN)  SEE


 PROTOCOL


 TABLE  QHS


 SC


   2/20/21 21:00


     





 


 Lisinopril


  (Prinivil)  20 mg  DAILY


 PO


   2/21/21 09:00


 2/20/21 14:25 DC  





 


 Losartan Potassium


  (Cozaar)  25 mg  DAILY


 PO


   2/20/21 09:00


    2/22/21 09:57





 


 Magnesium


 Hydroxide


  (Milk Of


 Magnesia)  30 ml  DAILYPRN  PRN


 PO


 CONSTIPATION  2/19/21 19:45


     





 


 Olanzapine


  (ZyPREXA)  2.5 mg  DAILY


 PO


   2/21/21 09:00


 2/22/21 08:53 DC 2/21/21 15:45





 


 Olanzapine


  (ZyPREXA)  5 mg  BID


 PO


   2/22/21 09:00


     





 


 Trazodone HCl


  (Desyrel)  50 mg  QHSP  PRN


 PO


 INSOMNIA  2/19/21 19:45


    2/21/21 20:50





 


 Vitamin D


  (Vitamin D)  1,000 units  DAILY


 PO


   2/20/21 09:00


    2/22/21 09:58














Allergies


Coded Allergies:  


     lisinopril (Verified  Allergy, Unknown, Tongue swelling, 2/19/21)


     prochlorperazine (Verified  Allergy, Unknown, tongue swelling, 2/19/21)











GEOVANY STEPHENS NP              Feb 22, 2021 14:53

## 2021-02-23 VITALS — SYSTOLIC BLOOD PRESSURE: 159 MMHG | DIASTOLIC BLOOD PRESSURE: 96 MMHG

## 2021-02-23 VITALS — DIASTOLIC BLOOD PRESSURE: 88 MMHG | SYSTOLIC BLOOD PRESSURE: 155 MMHG

## 2021-02-23 VITALS — SYSTOLIC BLOOD PRESSURE: 159 MMHG | DIASTOLIC BLOOD PRESSURE: 90 MMHG

## 2021-02-23 RX ADMIN — INSULIN LISPRO SCH UNITS: 100 INJECTION, SOLUTION INTRAVENOUS; SUBCUTANEOUS at 22:05

## 2021-02-23 RX ADMIN — Medication SCH UNITS: at 09:00

## 2021-02-23 RX ADMIN — INSULIN LISPRO SCH UNITS: 100 INJECTION, SOLUTION INTRAVENOUS; SUBCUTANEOUS at 06:55

## 2021-02-23 RX ADMIN — BUDESONIDE AND FORMOTEROL FUMARATE DIHYDRATE SCH PUFF: 160; 4.5 AEROSOL RESPIRATORY (INHALATION) at 22:04

## 2021-02-23 RX ADMIN — INSULIN LISPRO SCH UNITS: 100 INJECTION, SOLUTION INTRAVENOUS; SUBCUTANEOUS at 16:50

## 2021-02-23 RX ADMIN — BUDESONIDE AND FORMOTEROL FUMARATE DIHYDRATE SCH PUFF: 160; 4.5 AEROSOL RESPIRATORY (INHALATION) at 09:00

## 2021-02-23 RX ADMIN — LOSARTAN POTASSIUM SCH MG: 25 TABLET, FILM COATED ORAL at 09:00

## 2021-02-23 RX ADMIN — ASPIRIN 81 MG CHEWABLE TABLET SCH MG: 81 TABLET CHEWABLE at 09:00

## 2021-02-23 RX ADMIN — INSULIN DETEMIR SCH UNITS: 100 INJECTION, SOLUTION SUBCUTANEOUS at 22:09

## 2021-02-23 RX ADMIN — ATORVASTATIN CALCIUM SCH MG: 20 TABLET, FILM COATED ORAL at 09:00

## 2021-02-23 RX ADMIN — INSULIN LISPRO SCH UNITS: 100 INJECTION, SOLUTION INTRAVENOUS; SUBCUTANEOUS at 11:36

## 2021-02-23 RX ADMIN — INSULIN DETEMIR SCH UNITS: 100 INJECTION, SOLUTION SUBCUTANEOUS at 09:00

## 2021-02-23 NOTE — MHIPNPDOC
[facility] Progress Note


Progress Note


DATE OF SERVICE: 2/23/21





HISTORY:  Patient is a 50 -year-old , male, who states he is 

hearing voices, having suicidal thoughts using alcohol and drugs. He states he 

has been having these symptoms for 4-5 days. He rents a room, which is part of 

an apartment and has been in [place] for approximately 3 weeks. He has no 

psychiatric history here because he has never been in [place] and as 

mentioned, he has been in [place], 2-3 weeks prior to that he was in [place] 

and states she had no psychiatric care there. He does recall that he has had 

psychiatric care at some point, but he has no memory of his last visit. He has 

had several hospitalizations. He does not recall when the last one was. He was 

born in [place], and states unfortunately, "unfortunately he has family there." 

He has a grandmother and cousins in [place]. 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 

including delivery and digital marketing services. He states presently "there is

no person in the world requiring my presence and no place for me to be." He has 

been  and  twice and he has 2 children from his first wife, but 

he doesn't know where they are. He has no present legal difficulties but has 

previously been arrested for disorderly conduct. His alcohol use has been in and

he states that some first time he's been drinking in 30 years, but he has also 

used methamphetamines and crack. He states this is his first time using 

methamphetamine, but he is been using crack off and on since 1996. He states he 

is hearing voices modeled though they are, and numbering 3. He states they are 

talking and berating him. He states he has been depressed since Monday of this 

week. He states he is made over 15 suicide attempts using different methods of 

overdose and insulin. He is self educated but also has an associates degree. 





VITAL SIGNS: See below.








CURRENT MEDICATIONS: See below.





MENTAL STATUS EXAMINATION:Patient is a 50 -year-old , male, who 

states he is hearing voices, having suicidal thoughts using alcohol and drugs. 

He states he has been having these symptoms for 4-5 days. H


Speech: Is  normal rate, tone and volume


Language skills are intact


Thought processes including: linear and goal oriented


Thought content: reports depression and suicidal, has some vague planning. 

Abstract reasoning, and computation: fair  


Description of associations: reports auditory hallucinations that are berating 

and derogatory


Description of abnormal or psychotic thoughts: 


Judgment: Fair


Insight: Fair


Orientation: alert and oriented to person, place, time and situation


Recent and remote memory: intact


Attention span and concentration: fair


Language: expansive


Fund of knowledge: average


Mood: Depressed Affect: Flat





DIAGNOSES:


Major Depressive Disorder, Recurrent,  Recurrent, 


Polysubstance Use Disorder


Rule out Bipolar Disorder


Rule out Cluster B Personality Traits (Antisocial Personality Disorder)





ASSESSMENT: Patient was brought to interview by RN.  Asked patient about his 

past hospitalizations.  He reports being [facility] [place], [place], [facility], [facility], and [facility] [place].  He 

reports over 15 hospitalization mainly for depressive symptoms.  States that he 

was in both the [employer] and [employer] and was honorably discharged 

after 2 years of service.  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 

, has some electronics training.  In today's session he reports that he 

has derogatory hallucinations - telling him that he is "nothing, useless, can't 

do things, worthless"  Reports depression since probably high school.  Reports 

that he had a seizure in 7th grade and subsequently was seeing a counselor after

that.  He states today that he works faster than most people do, feels that he 

may be Bipolar but that the people he has worked with did inferior work and they

were underachievers.  He had reported that he is unsure whether he was energized

more than other people because he sees others not working as hard.  Reports that

Depakote took away his highs.  Patient is agreeable to Lamictal, requests 

discontinuation of Trazodone, reports good sleep on Seroquel and I am titrating 

patient off Zyprexa.  





TIME SPENT: 25 minutes.





Vital Signs





Vital Signs








  Date Time  Temp Pulse Resp B/P (MAP) Pulse Ox O2 Delivery O2 Flow Rate FiO2


 


2/23/21 06:00 97.7 77 16 155/88 (110)    


 


2/22/21 07:14     96 Room Air  











Laboratory Data


24H Labs


Laboratory Tests 2


2/22/21 17:24: Bedside Glucose (Misc Panel) 169H


2/22/21 19:49: Bedside Glucose (Misc Panel) 128H


2/23/21 06:51: Bedside Glucose (Misc Panel) 214H


2/23/21 11:34: Bedside Glucose (Misc Panel) 136H





Current Medications





Current Medications








 Medications


  (Trade)  Dose


 Ordered  Sig/Helen


 Route


 PRN Reason  Start Time


 Stop Time Status Last Admin


Dose Admin


 


 Acetaminophen


  (Tylenol Tab)  650 mg  Q6HP  PRN


 PO


 HEADACHE or DISCOMFORT  2/19/21 19:45


    2/20/21 21:05





 


 Al Hydrox/Mg


 Hydrox/Simethicone


  (Mylanta)  30 ml  Q4HP  PRN


 PO


 HEARTBURN/INDIGESTION  2/19/21 19:45


     





 


 Albuterol/


 Ipratropium


  (Combivent


 Respimat


 100-20mcg)  1 puff  QID  PRN


 INH


 SHORTNESS OF BREATH  2/20/21 14:15


    2/21/21 01:23





 


 Amlodipine


 Besylate


  (Norvasc)  5 mg  DAILY


 PO


   2/20/21 09:00


    2/22/21 09:58





 


 Aspirin


  (Aspirin


 Chewable)  81 mg  DAILY


 PO


   2/20/21 09:00


    2/22/21 09:58





 


 Atorvastatin


 Calcium


  (Lipitor)  80 mg  DAILY


 PO


   2/20/21 09:00


    2/22/21 09:57





 


 Benztropine


 Mesylate


  (Cogentin)  0.5 mg  BIDP  PRN


 PO


 EPS  2/22/21 15:30


     





 


 Budesonide/


 Formoterol


 Fumarate


  (Symbicort 160/


 4.5mcg)  2 puff  BID


 INH


   2/20/21 21:00


    2/22/21 19:54





 


 Dextrose


  (Dextrose 50%)  25 ml  ASDIRECTED  PRN


 IV


 SEE LABEL COMMENTS  2/20/21 14:15


     





 


 Glucagon


  (Glucagon)  1 mg  ASDIRECTED  PRN


 SC


 SEE LABEL COMMENTS  2/20/21 14:15


     





 


 Glucose


  (Glucose)  16 GM  ASDIRECTED  PRN


 PO


 SEE LABEL COMMENTS  2/20/21 14:15


     





 


 Home Med


  (Med Rec


 Complete!)    ASDIRECTED


 XX


   2/20/21 10:00


 2/20/21 10:12 DC  





 


 Hydroxyzine HCl


  (Atarax)  50 mg  TID


 PO


   2/20/21 16:00


    2/22/21 19:54





 


 Insulin Detemir


  (Levemir Insulin)  10 units  BID


 SC


   2/20/21 21:00


    2/22/21 21:06





 


 Insulin Human


 Lispro


  (HumaLOG INSULIN)  SEE


 PROTOCOL


 TABLE  AC


 SC


   2/20/21 17:30


    2/23/21 06:55





 


 Insulin Human


 Lispro


  (HumaLOG INSULIN)  SEE


 PROTOCOL


 TABLE  QHS


 SC


   2/20/21 21:00


     





 


 Lisinopril


  (Prinivil)  20 mg  DAILY


 PO


   2/21/21 09:00


 2/20/21 14:25 DC  





 


 Losartan Potassium


  (Cozaar)  25 mg  DAILY


 PO


   2/20/21 09:00


    2/22/21 09:57





 


 Magnesium


 Hydroxide


  (Milk Of


 Magnesia)  30 ml  DAILYPRN  PRN


 PO


 CONSTIPATION  2/19/21 19:45


     





 


 Olanzapine


  (ZyPREXA)  2.5 mg  DAILY


 PO


   2/21/21 09:00


 2/22/21 08:53 DC 2/21/21 15:45





 


 Olanzapine


  (ZyPREXA)  5 mg  BID


 PO


   2/22/21 09:00


     





 


 Trazodone HCl


  (Desyrel)  50 mg  QHSP  PRN


 PO


 INSOMNIA  2/19/21 19:45


    2/22/21 19:53





 


 Vitamin D


  (Vitamin D)  1,000 units  DAILY


 PO


   2/20/21 09:00


    2/22/21 09:58














Allergies


Coded Allergies:  


     lisinopril (Verified  Allergy, Unknown, Tongue swelling, 2/19/21)


     prochlorperazine (Verified  Allergy, Unknown, tongue swelling, 2/19/21)











GEOVANY STEPHENS NP              Feb 23, 2021 13:42
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
## 2021-02-24 VITALS — SYSTOLIC BLOOD PRESSURE: 128 MMHG | DIASTOLIC BLOOD PRESSURE: 88 MMHG

## 2021-02-24 RX ADMIN — LOSARTAN POTASSIUM SCH MG: 25 TABLET, FILM COATED ORAL at 09:00

## 2021-02-24 RX ADMIN — BUDESONIDE AND FORMOTEROL FUMARATE DIHYDRATE SCH PUFF: 160; 4.5 AEROSOL RESPIRATORY (INHALATION) at 09:00

## 2021-02-24 RX ADMIN — Medication SCH UNITS: at 09:00

## 2021-02-24 RX ADMIN — ASPIRIN 81 MG CHEWABLE TABLET SCH MG: 81 TABLET CHEWABLE at 09:00

## 2021-02-24 RX ADMIN — INSULIN DETEMIR SCH UNITS: 100 INJECTION, SOLUTION SUBCUTANEOUS at 09:00

## 2021-02-24 RX ADMIN — INSULIN LISPRO SCH UNITS: 100 INJECTION, SOLUTION INTRAVENOUS; SUBCUTANEOUS at 07:30

## 2021-02-24 RX ADMIN — INSULIN LISPRO SCH UNITS: 100 INJECTION, SOLUTION INTRAVENOUS; SUBCUTANEOUS at 11:54

## 2021-02-24 RX ADMIN — ATORVASTATIN CALCIUM SCH MG: 20 TABLET, FILM COATED ORAL at 09:00
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
## 2022-03-14 ENCOUNTER — HOSPITAL ENCOUNTER (OUTPATIENT)
Dept: HOSPITAL 53 - M ED | Age: 52
Setting detail: OBSERVATION
LOS: 1 days | Discharge: HOME | End: 2022-03-15
Attending: FAMILY MEDICINE | Admitting: FAMILY MEDICINE
Payer: OTHER GOVERNMENT

## 2022-03-14 VITALS — OXYGEN SATURATION: 96 %

## 2022-03-14 VITALS — WEIGHT: 250.89 LBS | BODY MASS INDEX: 33.98 KG/M2 | HEIGHT: 72 IN

## 2022-03-14 DIAGNOSIS — F20.9: ICD-10-CM

## 2022-03-14 DIAGNOSIS — R45.851: ICD-10-CM

## 2022-03-14 DIAGNOSIS — R60.0: ICD-10-CM

## 2022-03-14 DIAGNOSIS — Z79.84: ICD-10-CM

## 2022-03-14 DIAGNOSIS — Z79.899: ICD-10-CM

## 2022-03-14 DIAGNOSIS — Z86.79: ICD-10-CM

## 2022-03-14 DIAGNOSIS — R06.02: Primary | ICD-10-CM

## 2022-03-14 DIAGNOSIS — Z79.4: ICD-10-CM

## 2022-03-14 DIAGNOSIS — R00.0: ICD-10-CM

## 2022-03-14 DIAGNOSIS — R05.9: ICD-10-CM

## 2022-03-14 DIAGNOSIS — E11.21: ICD-10-CM

## 2022-03-14 DIAGNOSIS — F41.9: ICD-10-CM

## 2022-03-14 DIAGNOSIS — E78.5: ICD-10-CM

## 2022-03-14 DIAGNOSIS — F10.11: ICD-10-CM

## 2022-03-14 DIAGNOSIS — R06.01: ICD-10-CM

## 2022-03-14 DIAGNOSIS — J44.9: ICD-10-CM

## 2022-03-14 DIAGNOSIS — Z88.8: ICD-10-CM

## 2022-03-14 DIAGNOSIS — F60.2: ICD-10-CM

## 2022-03-14 DIAGNOSIS — F32.A: ICD-10-CM

## 2022-03-14 DIAGNOSIS — N18.9: ICD-10-CM

## 2022-03-14 DIAGNOSIS — R79.89: ICD-10-CM

## 2022-03-14 DIAGNOSIS — I11.9: ICD-10-CM

## 2022-03-14 DIAGNOSIS — F31.9: ICD-10-CM

## 2022-03-14 DIAGNOSIS — N17.9: ICD-10-CM

## 2022-03-14 DIAGNOSIS — F17.210: ICD-10-CM

## 2022-03-14 DIAGNOSIS — F19.99: ICD-10-CM

## 2022-03-14 LAB
ALBUMIN SERPL BCG-MCNC: 3 GM/DL (ref 3.2–5.2)
ALT SERPL W P-5'-P-CCNC: 76 U/L (ref 12–78)
AMPHETAMINES UR QL SCN: NEGATIVE
APAP SERPL-MCNC: < 2 UG/ML (ref 10–30)
BARBITURATES UR QL SCN: NEGATIVE
BENZODIAZ UR QL SCN: NEGATIVE
BILIRUB CONJ SERPL-MCNC: 0.2 MG/DL (ref 0–0.2)
BILIRUB SERPL-MCNC: 0.7 MG/DL (ref 0.2–1)
BUN SERPL-MCNC: 23 MG/DL (ref 7–18)
BZE UR QL SCN: NEGATIVE
CALCIUM SERPL-MCNC: 8.9 MG/DL (ref 8.5–10.1)
CANNABINOIDS UR QL SCN: NEGATIVE
CHLORIDE SERPL-SCNC: 110 MEQ/L (ref 98–107)
CO2 SERPL-SCNC: 26 MEQ/L (ref 21–32)
CREAT SERPL-MCNC: 1.86 MG/DL (ref 0.7–1.3)
ETHANOL SERPL-MCNC: < 0.003 % (ref 0–0.01)
GFR SERPL CREATININE-BSD FRML MDRD: 49.7 ML/MIN/{1.73_M2} (ref 56–?)
GLUCOSE SERPL-MCNC: 234 MG/DL (ref 70–100)
HCT VFR BLD AUTO: 39.6 % (ref 42–52)
HGB BLD-MCNC: 12.6 G/DL (ref 13.5–17.5)
MCH RBC QN AUTO: 26.8 PG (ref 27–33)
MCHC RBC AUTO-ENTMCNC: 31.8 G/DL (ref 32–36.5)
MCV RBC AUTO: 84.1 FL (ref 80–96)
METHADONE UR QL SCN: NEGATIVE
NT-PRO BNP: 2726 PG/ML (ref ?–125)
OPIATES UR QL SCN: NEGATIVE
PCP UR QL SCN: NEGATIVE
PLATELET # BLD AUTO: 263 10^3/UL (ref 150–450)
POTASSIUM SERPL-SCNC: 4.3 MEQ/L (ref 3.5–5.1)
PROT SERPL-MCNC: 6.5 GM/DL (ref 6.4–8.2)
RBC # BLD AUTO: 4.71 10^6/UL (ref 4.3–6.1)
SALICYLATES SERPL-MCNC: < 1.7 MG/DL (ref 5–30)
SODIUM SERPL-SCNC: 142 MEQ/L (ref 136–145)
TSH SERPL DL<=0.005 MIU/L-ACNC: 0.08 UIU/ML (ref 0.36–3.74)
WBC # BLD AUTO: 8.9 10^3/UL (ref 4–10)

## 2022-03-14 PROCEDURE — 83735 ASSAY OF MAGNESIUM: CPT

## 2022-03-14 PROCEDURE — 84439 ASSAY OF FREE THYROXINE: CPT

## 2022-03-14 PROCEDURE — 80048 BASIC METABOLIC PNL TOTAL CA: CPT

## 2022-03-14 PROCEDURE — 84443 ASSAY THYROID STIM HORMONE: CPT

## 2022-03-14 PROCEDURE — 36415 COLL VENOUS BLD VENIPUNCTURE: CPT

## 2022-03-14 PROCEDURE — 84484 ASSAY OF TROPONIN QUANT: CPT

## 2022-03-14 PROCEDURE — 71046 X-RAY EXAM CHEST 2 VIEWS: CPT

## 2022-03-14 PROCEDURE — 82550 ASSAY OF CK (CPK): CPT

## 2022-03-14 PROCEDURE — 80143 DRUG ASSAY ACETAMINOPHEN: CPT

## 2022-03-14 PROCEDURE — 80307 DRUG TEST PRSMV CHEM ANLYZR: CPT

## 2022-03-14 PROCEDURE — 85025 COMPLETE CBC W/AUTO DIFF WBC: CPT

## 2022-03-14 PROCEDURE — 87798 DETECT AGENT NOS DNA AMP: CPT

## 2022-03-14 PROCEDURE — 93005 ELECTROCARDIOGRAM TRACING: CPT

## 2022-03-14 PROCEDURE — 82077 ASSAY SPEC XCP UR&BREATH IA: CPT

## 2022-03-14 PROCEDURE — 84145 PROCALCITONIN (PCT): CPT

## 2022-03-14 PROCEDURE — 99285 EMERGENCY DEPT VISIT HI MDM: CPT

## 2022-03-14 PROCEDURE — 80076 HEPATIC FUNCTION PANEL: CPT

## 2022-03-14 PROCEDURE — 96374 THER/PROPH/DIAG INJ IV PUSH: CPT

## 2022-03-14 PROCEDURE — 85027 COMPLETE CBC AUTOMATED: CPT

## 2022-03-14 PROCEDURE — 93306 TTE W/DOPPLER COMPLETE: CPT

## 2022-03-14 PROCEDURE — 82553 CREATINE MB FRACTION: CPT

## 2022-03-14 PROCEDURE — 83036 HEMOGLOBIN GLYCOSYLATED A1C: CPT

## 2022-03-14 PROCEDURE — 83880 ASSAY OF NATRIURETIC PEPTIDE: CPT

## 2022-03-14 RX ADMIN — IPRATROPIUM BROMIDE AND ALBUTEROL SULFATE PRN ML: .5; 3 SOLUTION RESPIRATORY (INHALATION) at 23:25

## 2022-03-15 VITALS — DIASTOLIC BLOOD PRESSURE: 92 MMHG | SYSTOLIC BLOOD PRESSURE: 124 MMHG

## 2022-03-15 VITALS — SYSTOLIC BLOOD PRESSURE: 129 MMHG | DIASTOLIC BLOOD PRESSURE: 80 MMHG

## 2022-03-15 VITALS — DIASTOLIC BLOOD PRESSURE: 98 MMHG | SYSTOLIC BLOOD PRESSURE: 133 MMHG

## 2022-03-15 VITALS — DIASTOLIC BLOOD PRESSURE: 108 MMHG | SYSTOLIC BLOOD PRESSURE: 140 MMHG

## 2022-03-15 LAB
BASOPHILS # BLD AUTO: 0.1 10^3/UL (ref 0–0.2)
BASOPHILS NFR BLD AUTO: 1.2 % (ref 0–1)
BUN SERPL-MCNC: 23 MG/DL (ref 7–18)
CALCIUM SERPL-MCNC: 9.1 MG/DL (ref 8.5–10.1)
CHLORIDE SERPL-SCNC: 104 MEQ/L (ref 98–107)
CK MB CFR.DF SERPL CALC: 1.48
CK MB SERPL-MCNC: 5.2 NG/ML (ref ?–3.6)
CK SERPL-CCNC: 352 U/L (ref 39–308)
CO2 SERPL-SCNC: 30 MEQ/L (ref 21–32)
CREAT SERPL-MCNC: 1.8 MG/DL (ref 0.7–1.3)
EOSINOPHIL # BLD AUTO: 0.6 10^3/UL (ref 0–0.5)
EOSINOPHIL NFR BLD AUTO: 7.9 % (ref 0–3)
EST. AVERAGE GLUCOSE BLD GHB EST-MCNC: 200 MG/DL (ref 60–110)
GFR SERPL CREATININE-BSD FRML MDRD: 51.6 ML/MIN/{1.73_M2} (ref 56–?)
GLUCOSE SERPL-MCNC: 210 MG/DL (ref 70–100)
HCT VFR BLD AUTO: 39 % (ref 42–52)
HGB BLD-MCNC: 12.3 G/DL (ref 13.5–17.5)
LYMPHOCYTES # BLD AUTO: 4 10^3/UL (ref 1.5–5)
LYMPHOCYTES NFR BLD AUTO: 52.4 % (ref 24–44)
MAGNESIUM SERPL-MCNC: 1.6 MG/DL (ref 1.8–2.4)
MCH RBC QN AUTO: 26.4 PG (ref 27–33)
MCHC RBC AUTO-ENTMCNC: 31.5 G/DL (ref 32–36.5)
MCV RBC AUTO: 83.7 FL (ref 80–96)
MONOCYTES # BLD AUTO: 0.4 10^3/UL (ref 0–0.8)
MONOCYTES NFR BLD AUTO: 5.8 % (ref 2–8)
NEUTROPHILS # BLD AUTO: 2.5 10^3/UL (ref 1.5–8.5)
NEUTROPHILS NFR BLD AUTO: 32.6 % (ref 36–66)
PLATELET # BLD AUTO: 245 10^3/UL (ref 150–450)
POTASSIUM SERPL-SCNC: 3.8 MEQ/L (ref 3.5–5.1)
RBC # BLD AUTO: 4.66 10^6/UL (ref 4.3–6.1)
SODIUM SERPL-SCNC: 138 MEQ/L (ref 136–145)
T4 FREE SERPL-MCNC: 1.33 NG/DL (ref 0.76–1.46)
WBC # BLD AUTO: 7.6 10^3/UL (ref 4–10)

## 2022-03-15 RX ADMIN — BUDESONIDE AND FORMOTEROL FUMARATE DIHYDRATE SCH PUFF: 80; 4.5 AEROSOL RESPIRATORY (INHALATION) at 07:12

## 2022-03-15 RX ADMIN — INSULIN LISPRO SCH UNITS: 100 INJECTION, SOLUTION INTRAVENOUS; SUBCUTANEOUS at 08:04

## 2022-03-15 RX ADMIN — INSULIN LISPRO SCH UNITS: 100 INJECTION, SOLUTION INTRAVENOUS; SUBCUTANEOUS at 12:30

## 2022-03-15 RX ADMIN — BUDESONIDE AND FORMOTEROL FUMARATE DIHYDRATE SCH PUFF: 80; 4.5 AEROSOL RESPIRATORY (INHALATION) at 19:14

## 2022-03-15 RX ADMIN — INSULIN LISPRO SCH UNITS: 100 INJECTION, SOLUTION INTRAVENOUS; SUBCUTANEOUS at 17:41

## 2022-03-15 RX ADMIN — IPRATROPIUM BROMIDE AND ALBUTEROL SULFATE PRN ML: .5; 3 SOLUTION RESPIRATORY (INHALATION) at 19:14

## 2022-03-24 ENCOUNTER — HOSPITAL ENCOUNTER (INPATIENT)
Dept: HOSPITAL 53 - M ED | Age: 52
LOS: 1 days | Discharge: ANOTHER HOSPITAL | DRG: 291 | End: 2022-03-25
Attending: STUDENT IN AN ORGANIZED HEALTH CARE EDUCATION/TRAINING PROGRAM | Admitting: STUDENT IN AN ORGANIZED HEALTH CARE EDUCATION/TRAINING PROGRAM
Payer: OTHER GOVERNMENT

## 2022-03-24 VITALS — WEIGHT: 253.53 LBS | BODY MASS INDEX: 34.34 KG/M2 | HEIGHT: 72 IN

## 2022-03-24 VITALS — DIASTOLIC BLOOD PRESSURE: 83 MMHG | SYSTOLIC BLOOD PRESSURE: 152 MMHG

## 2022-03-24 VITALS — SYSTOLIC BLOOD PRESSURE: 132 MMHG | DIASTOLIC BLOOD PRESSURE: 76 MMHG

## 2022-03-24 VITALS — DIASTOLIC BLOOD PRESSURE: 87 MMHG | SYSTOLIC BLOOD PRESSURE: 126 MMHG

## 2022-03-24 DIAGNOSIS — I34.0: ICD-10-CM

## 2022-03-24 DIAGNOSIS — Z88.8: ICD-10-CM

## 2022-03-24 DIAGNOSIS — I50.23: ICD-10-CM

## 2022-03-24 DIAGNOSIS — I48.92: ICD-10-CM

## 2022-03-24 DIAGNOSIS — F20.9: ICD-10-CM

## 2022-03-24 DIAGNOSIS — I27.20: ICD-10-CM

## 2022-03-24 DIAGNOSIS — I13.0: Primary | ICD-10-CM

## 2022-03-24 DIAGNOSIS — J44.9: ICD-10-CM

## 2022-03-24 DIAGNOSIS — D64.9: ICD-10-CM

## 2022-03-24 DIAGNOSIS — Z79.4: ICD-10-CM

## 2022-03-24 DIAGNOSIS — N18.9: ICD-10-CM

## 2022-03-24 DIAGNOSIS — F17.200: ICD-10-CM

## 2022-03-24 DIAGNOSIS — R07.89: ICD-10-CM

## 2022-03-24 DIAGNOSIS — E11.22: ICD-10-CM

## 2022-03-24 LAB
APTT BLD: 32.6 SECONDS (ref 25.9–37)
BASOPHILS # BLD AUTO: 0.1 10^3/UL (ref 0–0.2)
BASOPHILS NFR BLD AUTO: 1.1 % (ref 0–1)
BUN SERPL-MCNC: 24 MG/DL (ref 7–18)
CALCIUM SERPL-MCNC: 8.9 MG/DL (ref 8.5–10.1)
CHLORIDE SERPL-SCNC: 110 MEQ/L (ref 98–107)
CO2 SERPL-SCNC: 25 MEQ/L (ref 21–32)
CREAT SERPL-MCNC: 1.7 MG/DL (ref 0.7–1.3)
EOSINOPHIL # BLD AUTO: 0.3 10^3/UL (ref 0–0.5)
EOSINOPHIL NFR BLD AUTO: 4.2 % (ref 0–3)
GFR SERPL CREATININE-BSD FRML MDRD: 55.1 ML/MIN/{1.73_M2} (ref 56–?)
GLUCOSE SERPL-MCNC: 171 MG/DL (ref 70–100)
HCT VFR BLD AUTO: 39.3 % (ref 42–52)
HGB BLD-MCNC: 12.3 G/DL (ref 13.5–17.5)
INR PPP: 1.18
LYMPHOCYTES # BLD AUTO: 4.5 10^3/UL (ref 1.5–5)
LYMPHOCYTES NFR BLD AUTO: 54.6 % (ref 24–44)
MCH RBC QN AUTO: 26.7 PG (ref 27–33)
MCHC RBC AUTO-ENTMCNC: 31.3 G/DL (ref 32–36.5)
MCV RBC AUTO: 85.4 FL (ref 80–96)
MONOCYTES # BLD AUTO: 0.5 10^3/UL (ref 0–0.8)
MONOCYTES NFR BLD AUTO: 5.7 % (ref 2–8)
NEUTROPHILS # BLD AUTO: 2.8 10^3/UL (ref 1.5–8.5)
NEUTROPHILS NFR BLD AUTO: 34 % (ref 36–66)
NT-PRO BNP: 3592 PG/ML (ref ?–125)
PLATELET # BLD AUTO: 278 10^3/UL (ref 150–450)
POTASSIUM SERPL-SCNC: 4 MEQ/L (ref 3.5–5.1)
PROTHROMBIN TIME: 15.4 SECONDS (ref 12.7–14.5)
RBC # BLD AUTO: 4.6 10^6/UL (ref 4.3–6.1)
SODIUM SERPL-SCNC: 140 MEQ/L (ref 136–145)
WBC # BLD AUTO: 8.2 10^3/UL (ref 4–10)

## 2022-03-24 RX ADMIN — LEVALBUTEROL HYDROCHLORIDE SCH MG: 1.25 SOLUTION, CONCENTRATE RESPIRATORY (INHALATION) at 14:00

## 2022-03-24 RX ADMIN — INSULIN LISPRO SCH UNITS: 100 INJECTION, SOLUTION INTRAVENOUS; SUBCUTANEOUS at 17:58

## 2022-03-24 RX ADMIN — BENZONATATE PRN MG: 100 CAPSULE ORAL at 17:32

## 2022-03-24 RX ADMIN — FUROSEMIDE SCH MG: 10 INJECTION, SOLUTION INTRAMUSCULAR; INTRAVENOUS at 19:04

## 2022-03-24 RX ADMIN — LEVALBUTEROL HYDROCHLORIDE SCH MG: 1.25 SOLUTION, CONCENTRATE RESPIRATORY (INHALATION) at 20:40

## 2022-03-24 RX ADMIN — INSULIN LISPRO SCH UNITS: 100 INJECTION, SOLUTION INTRAVENOUS; SUBCUTANEOUS at 12:00

## 2022-03-24 RX ADMIN — METOPROLOL TARTRATE SCH MG: 25 TABLET, FILM COATED ORAL at 23:19

## 2022-03-24 RX ADMIN — METOPROLOL TARTRATE SCH MG: 25 TABLET, FILM COATED ORAL at 18:11

## 2022-03-24 RX ADMIN — IPRATROPIUM BROMIDE SCH MG: 0.5 SOLUTION RESPIRATORY (INHALATION) at 14:00

## 2022-03-24 RX ADMIN — IPRATROPIUM BROMIDE SCH MG: 0.5 SOLUTION RESPIRATORY (INHALATION) at 20:40

## 2022-03-24 RX ADMIN — APIXABAN SCH MG: 5 TABLET, FILM COATED ORAL at 21:13

## 2022-03-25 VITALS — SYSTOLIC BLOOD PRESSURE: 119 MMHG | DIASTOLIC BLOOD PRESSURE: 86 MMHG

## 2022-03-25 VITALS — SYSTOLIC BLOOD PRESSURE: 132 MMHG | DIASTOLIC BLOOD PRESSURE: 90 MMHG

## 2022-03-25 VITALS — SYSTOLIC BLOOD PRESSURE: 122 MMHG | DIASTOLIC BLOOD PRESSURE: 85 MMHG

## 2022-03-25 VITALS — SYSTOLIC BLOOD PRESSURE: 102 MMHG | DIASTOLIC BLOOD PRESSURE: 56 MMHG

## 2022-03-25 VITALS — SYSTOLIC BLOOD PRESSURE: 135 MMHG | DIASTOLIC BLOOD PRESSURE: 92 MMHG

## 2022-03-25 LAB
BUN SERPL-MCNC: 33 MG/DL (ref 7–18)
CALCIUM SERPL-MCNC: 8.4 MG/DL (ref 8.5–10.1)
CHLORIDE SERPL-SCNC: 107 MEQ/L (ref 98–107)
CO2 SERPL-SCNC: 27 MEQ/L (ref 21–32)
CREAT SERPL-MCNC: 2.23 MG/DL (ref 0.7–1.3)
GFR SERPL CREATININE-BSD FRML MDRD: 40.3 ML/MIN/{1.73_M2} (ref 56–?)
GLUCOSE SERPL-MCNC: 113 MG/DL (ref 70–100)
HCT VFR BLD AUTO: 42.1 % (ref 42–52)
HGB BLD-MCNC: 13.4 G/DL (ref 13.5–17.5)
MAGNESIUM SERPL-MCNC: 1.8 MG/DL (ref 1.8–2.4)
MCH RBC QN AUTO: 26.7 PG (ref 27–33)
MCHC RBC AUTO-ENTMCNC: 31.8 G/DL (ref 32–36.5)
MCV RBC AUTO: 84 FL (ref 80–96)
PHOSPHATE SERPL-MCNC: 5.3 MG/DL (ref 2.5–4.9)
PLATELET # BLD AUTO: 299 10^3/UL (ref 150–450)
POTASSIUM SERPL-SCNC: 4.6 MEQ/L (ref 3.5–5.1)
RBC # BLD AUTO: 5.01 10^6/UL (ref 4.3–6.1)
SODIUM SERPL-SCNC: 140 MEQ/L (ref 136–145)
WBC # BLD AUTO: 8.8 10^3/UL (ref 4–10)

## 2022-03-25 RX ADMIN — APIXABAN SCH MG: 5 TABLET, FILM COATED ORAL at 07:57

## 2022-03-25 RX ADMIN — INSULIN LISPRO SCH UNITS: 100 INJECTION, SOLUTION INTRAVENOUS; SUBCUTANEOUS at 07:58

## 2022-03-25 RX ADMIN — IPRATROPIUM BROMIDE SCH MG: 0.5 SOLUTION RESPIRATORY (INHALATION) at 15:20

## 2022-03-25 RX ADMIN — BENZONATATE PRN MG: 100 CAPSULE ORAL at 05:36

## 2022-03-25 RX ADMIN — IPRATROPIUM BROMIDE SCH MG: 0.5 SOLUTION RESPIRATORY (INHALATION) at 01:59

## 2022-03-25 RX ADMIN — INSULIN LISPRO SCH UNITS: 100 INJECTION, SOLUTION INTRAVENOUS; SUBCUTANEOUS at 17:12

## 2022-03-25 RX ADMIN — FUROSEMIDE SCH MG: 10 INJECTION, SOLUTION INTRAMUSCULAR; INTRAVENOUS at 05:31

## 2022-03-25 RX ADMIN — IPRATROPIUM BROMIDE SCH MG: 0.5 SOLUTION RESPIRATORY (INHALATION) at 07:26

## 2022-03-25 RX ADMIN — LEVALBUTEROL HYDROCHLORIDE SCH MG: 1.25 SOLUTION, CONCENTRATE RESPIRATORY (INHALATION) at 07:26

## 2022-03-25 RX ADMIN — IPRATROPIUM BROMIDE SCH MG: 0.5 SOLUTION RESPIRATORY (INHALATION) at 12:00

## 2022-03-25 RX ADMIN — METOPROLOL TARTRATE SCH MG: 25 TABLET, FILM COATED ORAL at 05:28

## 2022-03-25 RX ADMIN — INSULIN LISPRO SCH UNITS: 100 INJECTION, SOLUTION INTRAVENOUS; SUBCUTANEOUS at 12:34

## 2022-03-25 RX ADMIN — LEVALBUTEROL HYDROCHLORIDE SCH MG: 1.25 SOLUTION, CONCENTRATE RESPIRATORY (INHALATION) at 01:59
